# Patient Record
Sex: MALE | Race: WHITE
[De-identification: names, ages, dates, MRNs, and addresses within clinical notes are randomized per-mention and may not be internally consistent; named-entity substitution may affect disease eponyms.]

---
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
## 2021-01-25 ENCOUNTER — HOSPITAL ENCOUNTER (EMERGENCY)
Dept: HOSPITAL 56 - MW.ED | Age: 21
Discharge: HOME | End: 2021-01-25
Payer: COMMERCIAL

## 2021-01-25 VITALS — HEART RATE: 70 BPM | DIASTOLIC BLOOD PRESSURE: 49 MMHG | SYSTOLIC BLOOD PRESSURE: 114 MMHG

## 2021-01-25 DIAGNOSIS — S59.901A: Primary | ICD-10-CM
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
## 2021-01-25 PROCEDURE — 99283 EMERGENCY DEPT VISIT LOW MDM: CPT

## 2021-01-25 PROCEDURE — 73080 X-RAY EXAM OF ELBOW: CPT

## 2021-01-25 NOTE — EDM.PDOC
ED HPI GENERAL MEDICAL PROBLEM





- General


Chief Complaint: Upper Extremity Injury/Pain


Stated Complaint: RUN INTO BY CALF, RT ARM PAIN


Time Seen by Provider: 01/25/21 15:28


Source of Information: Reports: Patient


History Limitations: Reports: No Limitations





- History of Present Illness


INITIAL COMMENTS - FREE TEXT/NARRATIVE: 





Healthy 20-year-old male no past medical history presents for injury to right 

elbow.  Patient was working when he was "charged by a calf".  He states that the

cow caused him to fall backwards and hit right elbow on a metal grate.  He 

denies hitting his head or loss of consciousness.  He was ambulatory immediately

after the accident.  He notes continued pain and swelling in his right elbow 

which is worse with active range of motion.  He denies any loss of sensation.


  ** right elbow


Pain Score (Numeric/FACES): 7





- Related Data


                                    Allergies











Allergy/AdvReac Type Severity Reaction Status Date / Time


 


No Known Allergies Allergy   Verified 01/25/21 15:36











Home Meds: 


                                    Home Meds





Ibuprofen [Motrin] 600 mg PO Q6H PRN #20 tab 01/25/21 [Rx]











Past Medical History





- Past Surgical History


Musculoskeletal Surgical History: Reports: Other (See Below)





Social & Family History





- Family History


Family Medical History: No Pertinent Family History





Review of Systems





- Review of Systems


Review Of Systems: Comprehensive ROS is negative, except as noted in HPI.





ED EXAM, GENERAL





- Physical Exam


Exam: See Below


Exam Limited By: No Limitations


General Appearance: Alert, WD/WN, No Apparent Distress


Eye Exam: Bilateral Eye: PERRL


Ears: Normal External Exam


Nose: Normal Inspection


Throat/Mouth: Normal Voice, No Airway Compromise


Head: Atraumatic, Normocephalic


Neck: Normal Inspection, Non-Tender


Respiratory/Chest: No Respiratory Distress, No Accessory Muscle Use


Cardiovascular: Normal Peripheral Pulses


Back Exam: Normal Inspection


Extremities: Other (mild swelling of R soft tissue proximal arm by elbow; no 

olacrenon TTP; +pain with active ROM R elbow, no TTP/deformity/pain w/ ROM of R 

shoulder, preserved  strength b/l UE)


Neurological: Alert, Oriented, Normal Gait


Psychiatric: Normal Affect, Normal Mood


Skin Exam: Warm, Dry, Intact, Normal Color





Course





- Vital Signs


Last Recorded V/S: 


                                Last Vital Signs











Temp  98.2 F   01/25/21 15:34


 


Pulse  51 L  01/25/21 15:34


 


Resp  16   01/25/21 15:34


 


BP  125/57 L  01/25/21 15:34


 


Pulse Ox  99   01/25/21 15:34














- Orders/Labs/Meds


Orders: 


                               Active Orders 24 hr











 Category Date Time Status


 


 Ace Bandage [RC] ONETIME Care  01/25/21 16:23 Ordered











Meds: 


Medications














Discontinued Medications














Generic Name Dose Route Start Last Admin





  Trade Name Freq  PRN Reason Stop Dose Admin


 


Ibuprofen  600 mg  01/25/21 15:44  01/25/21 15:55





  Motrin  PO  01/25/21 15:45  600 mg





  ONETIME ONE   Administration


 


Oxycodone/Acetaminophen  1 tab  01/25/21 15:44  01/25/21 15:55





  Percocet 325-5 Mg  PO  01/25/21 15:45  1 tab





  ONETIME ONE   Administration














- Re-Assessments/Exams


Free Text/Narrative Re-Assessment/Exam: 





01/25/21 15:47


We will treat pain with Motrin and Percocet.  Will get x-ray imaging of the 

right elbow.  We will follow up results and disposition accordingly


01/25/21 16:24


X-ray imaging does not reveal evidence of fracture.  Will discharge with Ace 

wrap and short course of Motrin for analgesia.  Rest, ice, compression, 

elevation discussed.  Possibility of occult fracture also discussed.





Departure





- Departure


Time of Disposition: 16:24


Disposition: Home, Self-Care 01


Condition: Good


Clinical Impression: 


Elbow injury


Qualifiers:


 Encounter type: initial encounter Laterality: right Qualified Code(s): S59.901A

 - Unspecified injury of right elbow, initial encounter








- Discharge Information


Prescriptions: 


Ibuprofen [Motrin] 600 mg PO Q6H PRN #20 tab


 PRN Reason: Pain


Instructions:  Elbow Sprain


Referrals: 


PCP,None [Primary Care Provider] - 


Forms:  ED Department Discharge


Additional Instructions: 


The following information is given to patients seen in the emergency department 

who are being discharged to home. This information is to outline your options 

for follow-up care. We provide all patients seen in our emergency department 

with a follow-up referral.





The need for follow-up, as well as the timing and circumstances, are variable 

depending upon the specifics of your emergency department visit.





If you don't have a primary care physician on staff, we will provide you with a 

referral. We always advise you to contact your personal physician following an 

emergency department visit to inform them of the circumstance of the visit and 

for follow-up with them and/or the need for any referrals to a consulting 

specialist.





The emergency department will also refer you to a specialist when appropriate. 

This referral assures that you have the opportunity for follow-up care with a 

specialist. All of these measure are taken in an effort to provide you with 

optimal care, which includes your follow-up.





Under all circumstances we always encourage you to contact your private 

physician who remains a resource for coordinating your care. When calling for 

follow-up care, please make the office aware that this follow-up is from your 

recent emergency room visit. If for any reason you are refused follow-up, please

contact the [facility] Emergency Department

at [phone] and asked to speak to the emergency department charge nurse.





Please follow up with your primary care physician. If you do not have a primary 

care physician, see below:


[facility]


[address] [place] [phone]





[facility]


[address] [place] [id number] [phone]








[facility]


[address] [place] [id number]


Phone: [phone]


Fax: [phone]








Sepsis Event Note (ED)





- Evaluation


Sepsis Screening Result: No Definite Risk





- Focused Exam


Vital Signs: 


                                   Vital Signs











  Temp Pulse Resp BP Pulse Ox


 


 01/25/21 15:34  98.2 F  51 L  16  125/57 L  99














- My Orders


Last 24 Hours: 


My Active Orders





01/25/21 16:23


Ace Bandage [RC] ONETIME 














- Assessment/Plan


Last 24 Hours: 


My Active Orders





01/25/21 16:23


Ace Bandage [RC] ONETIME